# Patient Record
Sex: MALE | Race: BLACK OR AFRICAN AMERICAN | Employment: OTHER | URBAN - METROPOLITAN AREA
[De-identification: names, ages, dates, MRNs, and addresses within clinical notes are randomized per-mention and may not be internally consistent; named-entity substitution may affect disease eponyms.]

---

## 2019-05-09 ENCOUNTER — HOSPITAL ENCOUNTER (OUTPATIENT)
Dept: MRI IMAGING | Age: 84
Discharge: HOME | End: 2019-05-09
Attending: PHYSICIAN ASSISTANT
Payer: MEDICARE

## 2019-05-09 DIAGNOSIS — R41.3 MEMORY LOSS: ICD-10-CM

## 2019-05-09 PROCEDURE — 70551 MRI BRAIN STEM W/O DYE: CPT
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
## 2020-02-10 ENCOUNTER — OFFICE VISIT (OUTPATIENT)
Dept: NEUROLOGY | Age: 85
End: 2020-02-10

## 2020-02-10 DIAGNOSIS — R41.3 MEMORY LOSS: Primary | ICD-10-CM

## 2020-02-10 DIAGNOSIS — R45.89 DEPRESSED MOOD: ICD-10-CM

## 2020-02-10 DIAGNOSIS — R47.89 WORD FINDING DIFFICULTY: ICD-10-CM

## 2020-03-04 ENCOUNTER — OFFICE VISIT (OUTPATIENT)
Dept: NEUROLOGY | Age: 85
End: 2020-03-04

## 2020-03-04 DIAGNOSIS — G30.9 MIXED DEMENTIA (HCC): Primary | ICD-10-CM

## 2020-03-04 DIAGNOSIS — F01.50 MIXED DEMENTIA (HCC): Primary | ICD-10-CM

## 2020-03-04 DIAGNOSIS — F02.80 MIXED DEMENTIA (HCC): Primary | ICD-10-CM
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
## 2020-06-08 ENCOUNTER — VIRTUAL VISIT (OUTPATIENT)
Dept: NEUROLOGY | Age: 85
End: 2020-06-08

## 2020-06-08 DIAGNOSIS — F02.80 MIXED DEMENTIA (HCC): Primary | ICD-10-CM

## 2020-06-08 DIAGNOSIS — G30.9 MIXED DEMENTIA (HCC): Primary | ICD-10-CM

## 2020-06-08 DIAGNOSIS — F01.50 MIXED DEMENTIA (HCC): Primary | ICD-10-CM

## 2020-06-08 NOTE — PROGRESS NOTES
Interactive Psychotherapy/office feedback-Virtual        Interactive office feedback session with Mr. José Luis Mcmahan, his wife, and his adult daughter. I reviewed the results of the recent Neuropsychological Evaluation  including the observed areas of neurocognitive strengths and weaknesses. Education was provided regarding my diagnostic impressions, and treatment plan/options were discussed. I also answered numerous questions related to the clinical findings, including the various methods to improve cognition and mood. CBT, psychoeducation, and supportive psychotherapy techniques were utilized. Prior to seeing the patient I reviewed the records, including the previously completed report, the records in [place], and any updated visits from other providers since I saw the patient last.       Diagnoses:      1. Mixed Dementia, severe, with behavioral disturbance (dysphoric mood, anxiety, irritability)     The patient will follow up with the referring provider, and reported being very pleased with the services provided. Follow up [facility]. [id number] psychotherapy with patient, his spouse, and his daughter. 45 minutes       This note will not be viewable in [place]. This note was created using voice recognition software. Despite editing, there may be syntax errors. Noelle Menendez is a 82 y.o. male who was evaluated by an audio only encounter for concerns as above. Patient identification was verified prior to start of the visit. Pursuant to the emergency declaration under the [facility], 1135 waiver authority and the [employer] and [employer] Act, this Virtual Visit was conducted, with patient's (and/or legal guardian's) consent, to reduce the patient's risk of exposure to COVID-19 and provide necessary medical care. Services were provided through a synchronous discussion virtually to substitute for in-person clinic visit.  I was in the office. The patient was at home.